# Patient Record
Sex: MALE | Race: OTHER
[De-identification: names, ages, dates, MRNs, and addresses within clinical notes are randomized per-mention and may not be internally consistent; named-entity substitution may affect disease eponyms.]

---

## 2019-11-10 ENCOUNTER — HOSPITAL ENCOUNTER (EMERGENCY)
Dept: HOSPITAL 61 - ER | Age: 19
Discharge: HOME | End: 2019-11-10
Payer: COMMERCIAL

## 2019-11-10 VITALS — HEIGHT: 72 IN | BODY MASS INDEX: 33.86 KG/M2 | WEIGHT: 250 LBS

## 2019-11-10 VITALS — DIASTOLIC BLOOD PRESSURE: 67 MMHG | SYSTOLIC BLOOD PRESSURE: 152 MMHG

## 2019-11-10 DIAGNOSIS — Z88.0: ICD-10-CM

## 2019-11-10 DIAGNOSIS — F10.920: Primary | ICD-10-CM

## 2019-11-10 LAB
% BANDS: 2 % (ref 0–9)
% LYMPHS: 9 % (ref 24–48)
% MONOS: 7 % (ref 0–10)
% SEGS: 82 % (ref 35–66)
ALBUMIN SERPL-MCNC: 4.2 G/DL (ref 3.4–5)
ALP SERPL-CCNC: 73 U/L (ref 46–116)
ALT SERPL-CCNC: 163 U/L (ref 16–63)
ANION GAP SERPL CALC-SCNC: 14 MMOL/L (ref 6–14)
AST SERPL-CCNC: 54 U/L (ref 15–37)
BASOPHILS # BLD AUTO: 0 X10^3/UL (ref 0–0.2)
BASOPHILS NFR BLD: 0 % (ref 0–3)
BILIRUB DIRECT SERPL-MCNC: 0.1 MG/DL (ref 0–0.2)
BILIRUB SERPL-MCNC: 0.3 MG/DL (ref 0.2–1)
BUN SERPL-MCNC: 17 MG/DL (ref 8–26)
CALCIUM SERPL-MCNC: 8.7 MG/DL (ref 8.5–10.1)
CHLORIDE SERPL-SCNC: 104 MMOL/L (ref 98–107)
CO2 SERPL-SCNC: 25 MMOL/L (ref 21–32)
CREAT SERPL-MCNC: 1.2 MG/DL (ref 0.7–1.3)
EOSINOPHIL NFR BLD: 0 % (ref 0–3)
EOSINOPHIL NFR BLD: 0 X10^3/UL (ref 0–0.7)
ERYTHROCYTE [DISTWIDTH] IN BLOOD BY AUTOMATED COUNT: 13.8 % (ref 11.5–14.5)
GFR SERPLBLD BASED ON 1.73 SQ M-ARVRAT: 76.4 ML/MIN
GLUCOSE SERPL-MCNC: 118 MG/DL (ref 70–99)
HCT VFR BLD CALC: 45.6 % (ref 39–53)
HGB BLD-MCNC: 15.4 G/DL (ref 13–17.5)
LYMPHOCYTES # BLD: 1.1 X10^3/UL (ref 1–4.8)
LYMPHOCYTES NFR BLD AUTO: 8 % (ref 24–48)
MAGNESIUM SERPL-MCNC: 2.1 MG/DL (ref 1.8–2.4)
MCH RBC QN AUTO: 29 PG (ref 25–35)
MCHC RBC AUTO-ENTMCNC: 34 G/DL (ref 31–37)
MCV RBC AUTO: 85 FL (ref 79–100)
MONO #: 0.5 X10^3/UL (ref 0–1.1)
MONOCYTES NFR BLD: 3 % (ref 0–9)
NEUT #: 12.6 X10^3/UL (ref 1.8–7.7)
NEUTROPHILS NFR BLD AUTO: 89 % (ref 31–73)
PLATELET # BLD AUTO: 234 X10^3/UL (ref 140–400)
PLATELET # BLD EST: ADEQUATE 10*3/UL
POTASSIUM SERPL-SCNC: 4 MMOL/L (ref 3.5–5.1)
PROT SERPL-MCNC: 8.2 G/DL (ref 6.4–8.2)
RBC # BLD AUTO: 5.39 X10^6/UL (ref 4.3–5.7)
SODIUM SERPL-SCNC: 143 MMOL/L (ref 136–145)
TOXIC GRANULES BLD QL SMEAR: SLIGHT
WBC # BLD AUTO: 14.2 X10^3/UL (ref 4–11)

## 2019-11-10 PROCEDURE — 80048 BASIC METABOLIC PNL TOTAL CA: CPT

## 2019-11-10 PROCEDURE — 85025 COMPLETE CBC W/AUTO DIFF WBC: CPT

## 2019-11-10 PROCEDURE — 36415 COLL VENOUS BLD VENIPUNCTURE: CPT

## 2019-11-10 PROCEDURE — 96374 THER/PROPH/DIAG INJ IV PUSH: CPT

## 2019-11-10 PROCEDURE — 85007 BL SMEAR W/DIFF WBC COUNT: CPT

## 2019-11-10 PROCEDURE — 83735 ASSAY OF MAGNESIUM: CPT

## 2019-11-10 PROCEDURE — 80076 HEPATIC FUNCTION PANEL: CPT

## 2019-11-10 PROCEDURE — 99284 EMERGENCY DEPT VISIT MOD MDM: CPT

## 2019-11-10 NOTE — PHYS DOC
Adult General


Chief Complaint


Chief Complaint:  alcohol intoxication





HPI


HPI





Patient is a 19-year-old male who arrives via EMS with report of drinking too 

much alcohol, celebrating his 19st birthday. EMS reports that patient was having

dry heaves while in route. Additional history is limited as patient is very 

intoxicated and not answering questions.[]





Review of Systems


Review of Systems





Constitutional: No reported fever or chills []


Respiratory: No reported shortness of breath []


Cardiovascular: No additional information not addressed in HPI []


GI: Positive dry heaves without reported diarrhea []


Neurologic: Positive mental status changes []





Unable to fully assess review of systems due to level of intoxication.





Current Medications


Current Medications





Current Medications








 Medications


  (Trade)  Dose


 Ordered  Sig/Jasen  Start Time


 Stop Time Status Last Admin


Dose Admin


 


 Ondansetron HCl


  (Zofran)  4 mg  1X  ONCE  11/10/19 03:00


 11/10/19 03:01 DC 11/10/19 03:02


4 MG


 


 Sodium Chloride  1,000 ml @ 


 1,000 mls/hr  Q1H  11/10/19 03:00


 11/10/19 03:59 DC 11/10/19 03:02


1,000 MLS/HR











Allergies


Allergies





Allergies








Coded Allergies Type Severity Reaction Last Updated Verified


 


  Penicillins Allergy Intermediate  11/10/19 Yes











Physical Exam


Physical Exam





Constitutional: Well developed, well nourished, no acute distress, appears 

intoxicated. []


HENT: Normocephalic, atraumatic, bilateral external ears normal, oropharynx 

moist, no oral exudates, nose normal. []


Eyes: PERRLA, EOMI, conjunctiva normal, no discharge. [] 


Neck: Normal range of motion, no tenderness, supple, no stridor. [] 


Cardiovascular: Regular rate and rhythm[]


Lungs & Thorax:  Bilateral breath sounds clear to auscultation []


Abdomen: Bowel sounds normal, soft, no tenderness. [] 


Skin: Warm, dry, no erythema, no rash. [] 


Extremities: No tenderness, no cyanosis, no clubbing, ROM intact, no edema. [] 


Neurologic: Unresponsive to verbal stimuli, responsive to painful stimuli, no 

obvious focal deficits noted. []





Current Patient Data


Vital Signs





                                   Vital Signs








  Date Time  Temp Pulse Resp B/P (MAP) Pulse Ox O2 Delivery O2 Flow Rate FiO2


 


11/10/19 02:15 96.4 87 20  95   





 96.4       








Lab Values





                                Laboratory Tests








Test


 11/10/19


02:55


 


White Blood Count


 14.2 x10^3/uL


(4.0-11.0)  H


 


Red Blood Count


 5.39 x10^6/uL


(4.30-5.70)


 


Hemoglobin


 15.4 g/dL


(13.0-17.5)


 


Hematocrit


 45.6 %


(39.0-53.0)


 


Mean Corpuscular Volume


 85 fL ()





 


Mean Corpuscular Hemoglobin 29 pg (25-35)  


 


Mean Corpuscular Hemoglobin


Concent 34 g/dL


(31-37)


 


Red Cell Distribution Width


 13.8 %


(11.5-14.5)


 


Platelet Count


 234 x10^3/uL


(140-400)


 


Neutrophils (%) (Auto) 89 % (31-73)  H


 


Lymphocytes (%) (Auto) 8 % (24-48)  L


 


Monocytes (%) (Auto) 3 % (0-9)  


 


Eosinophils (%) (Auto) 0 % (0-3)  


 


Basophils (%) (Auto) 0 % (0-3)  


 


Neutrophils # (Auto)


 12.6 x10^3/uL


(1.8-7.7)  H


 


Lymphocytes # (Auto)


 1.1 x10^3/uL


(1.0-4.8)


 


Monocytes # (Auto)


 0.5 x10^3/uL


(0.0-1.1)


 


Eosinophils # (Auto)


 0.0 x10^3/uL


(0.0-0.7)


 


Basophils # (Auto)


 0.0 x10^3/uL


(0.0-0.2)


 


Segmented Neutrophils % 82 % (35-66)  H


 


Band Neutrophils % 2 % (0-9)  


 


Lymphocytes % 9 % (24-48)  L


 


Monocytes % 7 % (0-10)  


 


Toxic Granulation Slight  


 


Platelet Estimate


 Adequate


(ADEQUATE)


 


Sodium Level


 143 mmol/L


(136-145)


 


Potassium Level


 4.0 mmol/L


(3.5-5.1)


 


Chloride Level


 104 mmol/L


()


 


Carbon Dioxide Level


 25 mmol/L


(21-32)


 


Anion Gap 14 (6-14)  


 


Blood Urea Nitrogen


 17 mg/dL


(8-26)


 


Creatinine


 1.2 mg/dL


(0.7-1.3)


 


Estimated GFR


(Cockcroft-Gault) 76.4  





 


Glucose Level


 118 mg/dL


(70-99)  H


 


Calcium Level


 8.7 mg/dL


(8.5-10.1)


 


Magnesium Level


 2.1 mg/dL


(1.8-2.4)


 


Total Bilirubin


 0.3 mg/dL


(0.2-1.0)


 


Direct Bilirubin


 0.1 mg/dL


(0.0-0.2)


 


Aspartate Amino Transferase


(AST) 54 U/L (15-37)


H


 


Alanine Aminotransferase (ALT)


 163 U/L


(16-63)  H


 


Alkaline Phosphatase


 73 U/L


()


 


Total Protein


 8.2 g/dL


(6.4-8.2)


 


Albumin


 4.2 g/dL


(3.4-5.0)


 


Ethyl Alcohol Level


 166 mg/dL


(0-10)  H





                                Laboratory Tests


11/10/19 02:55








                                Laboratory Tests


11/10/19 02:55











EKG


EKG


[]





Radiology/Procedures


Radiology/Procedures


[]





Course & Med Decision Making


Course & Med Decision Making


Pertinent Labs and Imaging studies reviewed. (See chart for details)





Patient moved to room upon arrival was evaluated by your medical staff after wh

ich an IV was established and blood work was drawn. Patient's blood alcohol 

returned little over 160. I did reevaluate patient at 4:15 AM and patient is 

awake, alert and oriented at this time. Patient has not provided a urine 

specimen since arrival. He states that he just doesn't have to go to the b

athroom. Patient does indicate that he would like to be discharged home. He 

states that he thinks that his problem was that he didn't eat before drinking.





Dragon Disclaimer


Dragon Disclaimer


This electronic medical record was generated, in whole or in part, using a voice

 recognition dictation system.





Departure


Departure


Impression:  


   Primary Impression:  


   Alcohol intoxication


Disposition:  01 HOME, SELF-CARE


Condition:  STABLE


Patient Instructions:  Alcohol Intoxication





Problem Qualifiers








   Primary Impression:  


   Alcohol intoxication


   Complication of substance-induced condition:  uncomplicated  Qualified Codes:

     F10.920 - Alcohol use, unspecified with intoxication, uncomplicated








NATHALY ELLIS Jr. DO          Nov 10, 2019 02:21